# Patient Record
Sex: FEMALE | Race: AMERICAN INDIAN OR ALASKA NATIVE | URBAN - METROPOLITAN AREA
[De-identification: names, ages, dates, MRNs, and addresses within clinical notes are randomized per-mention and may not be internally consistent; named-entity substitution may affect disease eponyms.]

---

## 2021-01-15 ENCOUNTER — OFFICE VISIT (OUTPATIENT)
Dept: URBAN - METROPOLITAN AREA CLINIC 65 | Facility: CLINIC | Age: 46
End: 2021-01-15
Payer: MEDICARE

## 2021-01-15 DIAGNOSIS — H25.13 AGE-RELATED NUCLEAR CATARACT, BILATERAL: ICD-10-CM

## 2021-01-15 PROCEDURE — 99214 OFFICE O/P EST MOD 30 MIN: CPT | Performed by: OPHTHALMOLOGY

## 2021-01-15 PROCEDURE — 92134 CPTRZ OPH DX IMG PST SGM RTA: CPT | Performed by: OPHTHALMOLOGY

## 2021-01-15 ASSESSMENT — INTRAOCULAR PRESSURE
OS: 13
OD: 17

## 2021-01-15 NOTE — IMPRESSION/PLAN
Impression: Type 2 diabetes mellitus with proliferative diabetic retinopathy with macular edema, bilateral: W75.3451.
s/p PPV OD: (4/2020), Dr. Landa Estimable) s/p PPV OS: 2017 PUD Plan: Exam and OCT OD demonstrate quiescent PDR with tr DME after PPV. Exam OS is hazy, but retina appears attached. Vision is blurry from progressive cataract. Patient is cleared from a retina standpoint to proceed with cataract surgery. Discussed unknown prognosis. 

RTC 3-4 mo with OCT OU, poss Avastin OU

## 2021-01-15 NOTE — IMPRESSION/PLAN
Impression: Age-related nuclear cataract, bilateral: H25.13. Plan: Exam demonstrates advanced cataract from PPV.   See plan above

## 2021-01-15 NOTE — IMPRESSION/PLAN
Impression: Vitreous hemorrhage, bilateral: H43.13 OU. Plan: No obvious VH on exam;  view is hazy likely from cataract. Will re-evaluate after cataract.

## 2021-02-10 ENCOUNTER — NEW PATIENT (OUTPATIENT)
Dept: URBAN - METROPOLITAN AREA CLINIC 64 | Facility: CLINIC | Age: 46
End: 2021-02-10
Payer: MEDICARE

## 2021-02-10 DIAGNOSIS — H52.221 REGULAR ASTIGMATISM, RIGHT EYE: ICD-10-CM

## 2021-02-10 DIAGNOSIS — H25.811 COMBINED FORMS OF AGE-RELATED CATARACT, RIGHT EYE: ICD-10-CM

## 2021-02-10 DIAGNOSIS — H52.222 REGULAR ASTIGMATISM, LEFT EYE: ICD-10-CM

## 2021-02-10 DIAGNOSIS — H43.13 VITREOUS HEMORRHAGE, BILATERAL: ICD-10-CM

## 2021-02-10 DIAGNOSIS — H25.812 COMBINED FORMS OF AGE-RELATED CATARACT, LEFT EYE: Primary | ICD-10-CM

## 2021-02-10 PROCEDURE — 99202 OFFICE O/P NEW SF 15 MIN: CPT | Performed by: OPHTHALMOLOGY

## 2021-02-10 ASSESSMENT — KERATOMETRY
OD: 42.01
OS: 41.60

## 2021-02-10 ASSESSMENT — INTRAOCULAR PRESSURE
OS: 8
OD: 10

## 2021-02-10 ASSESSMENT — VISUAL ACUITY
OD: 20/CF 1'
OS: 20/HM

## 2021-02-12 ENCOUNTER — Encounter (OUTPATIENT)
Dept: URBAN - METROPOLITAN AREA CLINIC 64 | Facility: CLINIC | Age: 46
End: 2021-02-12
Payer: MEDICARE

## 2021-02-12 DIAGNOSIS — Z01.818 ENCOUNTER FOR OTHER PREPROCEDURAL EXAMINATION: Primary | ICD-10-CM

## 2021-02-12 PROCEDURE — 99203 OFFICE O/P NEW LOW 30 MIN: CPT | Performed by: NURSE PRACTITIONER

## 2021-02-12 PROCEDURE — 76519 ECHO EXAM OF EYE: CPT | Performed by: OPHTHALMOLOGY

## 2021-02-18 ENCOUNTER — SURGERY (OUTPATIENT)
Dept: URBAN - METROPOLITAN AREA SURGERY 42 | Facility: SURGERY | Age: 46
End: 2021-02-18
Payer: MEDICARE

## 2021-03-02 ENCOUNTER — SURGERY (OUTPATIENT)
Dept: URBAN - METROPOLITAN AREA SURGERY 42 | Facility: SURGERY | Age: 46
End: 2021-03-02
Payer: MEDICARE

## 2021-05-07 ENCOUNTER — OFFICE VISIT (OUTPATIENT)
Dept: URBAN - METROPOLITAN AREA CLINIC 65 | Facility: CLINIC | Age: 46
End: 2021-05-07
Payer: MEDICARE

## 2021-05-07 DIAGNOSIS — Z96.1 PRESENCE OF INTRAOCULAR LENS: ICD-10-CM

## 2021-05-07 DIAGNOSIS — E11.3513 TYPE 2 DIABETES MELLITUS WITH PROLIFERATIVE DIABETIC RETINOPATHY WITH MACULAR EDEMA, BILATERAL: Primary | ICD-10-CM

## 2021-05-07 PROCEDURE — 92134 CPTRZ OPH DX IMG PST SGM RTA: CPT | Performed by: OPHTHALMOLOGY

## 2021-05-07 PROCEDURE — 67028 INJECTION EYE DRUG: CPT | Performed by: OPHTHALMOLOGY

## 2021-05-07 PROCEDURE — 92014 COMPRE OPH EXAM EST PT 1/>: CPT | Performed by: OPHTHALMOLOGY

## 2021-05-07 ASSESSMENT — INTRAOCULAR PRESSURE
OD: 12
OS: 6

## 2021-05-07 NOTE — IMPRESSION/PLAN
Impression: Type 2 diabetes mellitus with proliferative diabetic retinopathy with macular edema, bilateral: Z31.6246.
s/p PPV OD: (4/2020), Dr. Stephane Barlow) s/p PPV OS: 2017 PUD Plan: Exam and OCT OD demonstrate quiescent PDR OD with tr DME after PPV. Exam OS is hazy due to new VH, but retina appears attached. Recommend avastin OS today. R/B/A discussed and patient elects to proceed. Intravitreal Avastin injected OS today without complication. 

RTC 1 mo with OCT OU, poss Avastin OU

## 2021-05-07 NOTE — IMPRESSION/PLAN
Impression: Vitreous hemorrhage, bilateral: H43.13 OU. Plan: + VH recently OS as patient notes a recent decline in vision a few months after cataract surgery. Exam confirms new vitreous hemorrhage in the Left eye, secondary to PDR. Discussed R/B/A's of observation vs. PPV vs. Anti-VEGF injection. Recommend AntiVEGF injection today. Patient to sleep with elevation. May consider PPV if does not resolve on its own.